# Patient Record
Sex: MALE | URBAN - METROPOLITAN AREA
[De-identification: names, ages, dates, MRNs, and addresses within clinical notes are randomized per-mention and may not be internally consistent; named-entity substitution may affect disease eponyms.]

---

## 2024-08-09 ENCOUNTER — APPOINTMENT (OUTPATIENT)
Dept: PRIMARY CARE | Facility: CLINIC | Age: 24
End: 2024-08-09
Payer: COMMERCIAL

## 2024-08-16 ENCOUNTER — APPOINTMENT (OUTPATIENT)
Dept: PRIMARY CARE | Facility: CLINIC | Age: 24
End: 2024-08-16
Payer: COMMERCIAL

## 2024-08-16 ENCOUNTER — LAB (OUTPATIENT)
Dept: LAB | Facility: LAB | Age: 24
End: 2024-08-16
Payer: COMMERCIAL

## 2024-08-16 VITALS
HEIGHT: 65 IN | BODY MASS INDEX: 25.83 KG/M2 | SYSTOLIC BLOOD PRESSURE: 125 MMHG | OXYGEN SATURATION: 95 % | HEART RATE: 83 BPM | DIASTOLIC BLOOD PRESSURE: 72 MMHG | WEIGHT: 155 LBS

## 2024-08-16 DIAGNOSIS — Z00.00 ANNUAL PHYSICAL EXAM: Primary | ICD-10-CM

## 2024-08-16 DIAGNOSIS — R22.1 MASS OF LEFT SIDE OF NECK: ICD-10-CM

## 2024-08-16 DIAGNOSIS — Z00.00 ANNUAL PHYSICAL EXAM: ICD-10-CM

## 2024-08-16 LAB
ALBUMIN SERPL BCP-MCNC: 4.6 G/DL (ref 3.4–5)
ALP SERPL-CCNC: 65 U/L (ref 33–120)
ALT SERPL W P-5'-P-CCNC: 19 U/L (ref 10–52)
ANION GAP SERPL CALC-SCNC: 14 MMOL/L (ref 10–20)
AST SERPL W P-5'-P-CCNC: 17 U/L (ref 9–39)
BASOPHILS # BLD AUTO: 0.03 X10*3/UL (ref 0–0.1)
BASOPHILS NFR BLD AUTO: 0.7 %
BILIRUB SERPL-MCNC: 0.4 MG/DL (ref 0–1.2)
BUN SERPL-MCNC: 13 MG/DL (ref 6–23)
CALCIUM SERPL-MCNC: 9.4 MG/DL (ref 8.6–10.6)
CHLORIDE SERPL-SCNC: 100 MMOL/L (ref 98–107)
CO2 SERPL-SCNC: 27 MMOL/L (ref 21–32)
CREAT SERPL-MCNC: 0.89 MG/DL (ref 0.5–1.3)
EGFRCR SERPLBLD CKD-EPI 2021: >90 ML/MIN/1.73M*2
EOSINOPHIL # BLD AUTO: 0.12 X10*3/UL (ref 0–0.7)
EOSINOPHIL NFR BLD AUTO: 2.7 %
ERYTHROCYTE [DISTWIDTH] IN BLOOD BY AUTOMATED COUNT: 11.9 % (ref 11.5–14.5)
GLUCOSE SERPL-MCNC: 83 MG/DL (ref 74–99)
HCT VFR BLD AUTO: 42 % (ref 41–52)
HGB BLD-MCNC: 14.5 G/DL (ref 13.5–17.5)
IMM GRANULOCYTES # BLD AUTO: 0.02 X10*3/UL (ref 0–0.7)
IMM GRANULOCYTES NFR BLD AUTO: 0.4 % (ref 0–0.9)
LYMPHOCYTES # BLD AUTO: 1.39 X10*3/UL (ref 1.2–4.8)
LYMPHOCYTES NFR BLD AUTO: 30.9 %
MCH RBC QN AUTO: 30.7 PG (ref 26–34)
MCHC RBC AUTO-ENTMCNC: 34.5 G/DL (ref 32–36)
MCV RBC AUTO: 89 FL (ref 80–100)
MONOCYTES # BLD AUTO: 0.5 X10*3/UL (ref 0.1–1)
MONOCYTES NFR BLD AUTO: 11.1 %
NEUTROPHILS # BLD AUTO: 2.44 X10*3/UL (ref 1.2–7.7)
NEUTROPHILS NFR BLD AUTO: 54.2 %
NRBC BLD-RTO: 0 /100 WBCS (ref 0–0)
PLATELET # BLD AUTO: 251 X10*3/UL (ref 150–450)
POTASSIUM SERPL-SCNC: 3.9 MMOL/L (ref 3.5–5.3)
PROT SERPL-MCNC: 7.3 G/DL (ref 6.4–8.2)
RBC # BLD AUTO: 4.72 X10*6/UL (ref 4.5–5.9)
SODIUM SERPL-SCNC: 137 MMOL/L (ref 136–145)
TSH SERPL-ACNC: 2.27 MIU/L (ref 0.44–3.98)
WBC # BLD AUTO: 4.5 X10*3/UL (ref 4.4–11.3)

## 2024-08-16 PROCEDURE — 84443 ASSAY THYROID STIM HORMONE: CPT

## 2024-08-16 PROCEDURE — 85025 COMPLETE CBC W/AUTO DIFF WBC: CPT

## 2024-08-16 PROCEDURE — 36415 COLL VENOUS BLD VENIPUNCTURE: CPT

## 2024-08-16 PROCEDURE — 80053 COMPREHEN METABOLIC PANEL: CPT

## 2024-08-16 RX ORDER — AMOXICILLIN AND CLAVULANATE POTASSIUM 875; 125 MG/1; MG/1
875 TABLET, FILM COATED ORAL 2 TIMES DAILY
Qty: 14 TABLET | Refills: 0 | Status: SHIPPED | OUTPATIENT
Start: 2024-08-16 | End: 2024-08-23

## 2024-08-16 ASSESSMENT — PATIENT HEALTH QUESTIONNAIRE - PHQ9
1. LITTLE INTEREST OR PLEASURE IN DOING THINGS: NOT AT ALL
SUM OF ALL RESPONSES TO PHQ9 QUESTIONS 1 AND 2: 0
2. FEELING DOWN, DEPRESSED OR HOPELESS: NOT AT ALL

## 2024-08-16 NOTE — PROGRESS NOTES
"Subjective   Patient ID: Temo Uribe is a 23 y.o. male who presents for Mass (Left side of neck).      HPI  Patient presents with 2-1/2 weeks history of numbness in the left side of neck that started 2-1/2 weeks ago and has been getting progressively bigger denies pain denies fever no sore throat no dental problems no skin issues has been healthy prior to this unsure about weight loss denies night sweats no appetites been fair  Has not seen a physician in years   Left side of neck 2.5 weeks   No pain.   Nno fever    No current outpatient medications on file prior to visit.     No current facility-administered medications on file prior to visit.        Review of Systems   Constitutional:  Negative for chills and fever.   HENT:          4 cm mass under the chin left side of neck hard to palpation nontender.  It is a fixed mass irregular   Respiratory: Negative.     Cardiovascular: Negative.    Gastrointestinal: Negative.  Negative for nausea and vomiting.   Endocrine: Negative.    Genitourinary: Negative.    Musculoskeletal: Negative.    Skin: Negative.  Negative for rash.   Allergic/Immunologic: Negative.    Neurological: Negative.    Hematological: Negative.    Psychiatric/Behavioral: Negative.     All other systems reviewed and are negative.      Objective   /72   Pulse 83   Ht 1.651 m (5' 5\")   Wt 70.3 kg (155 lb)   SpO2 95%   BMI 25.79 kg/m²   BSA: 1.8 meters squared  Growth percentiles: Facility age limit for growth %prateek is 20 years. Facility age limit for growth %prateek is 20 years.   Lab on 08/16/2024   Component Date Value Ref Range Status    WBC 08/16/2024 4.5  4.4 - 11.3 x10*3/uL Final    nRBC 08/16/2024 0.0  0.0 - 0.0 /100 WBCs Final    RBC 08/16/2024 4.72  4.50 - 5.90 x10*6/uL Final    Hemoglobin 08/16/2024 14.5  13.5 - 17.5 g/dL Final    Hematocrit 08/16/2024 42.0  41.0 - 52.0 % Final    MCV 08/16/2024 89  80 - 100 fL Final    MCH 08/16/2024 30.7  26.0 - 34.0 pg Final    MCHC 08/16/2024 " 34.5  32.0 - 36.0 g/dL Final    RDW 08/16/2024 11.9  11.5 - 14.5 % Final    Platelets 08/16/2024 251  150 - 450 x10*3/uL Final    Neutrophils % 08/16/2024 54.2  40.0 - 80.0 % Final    Immature Granulocytes %, Automated 08/16/2024 0.4  0.0 - 0.9 % Final    Immature Granulocyte Count (IG) includes promyelocytes, myelocytes and metamyelocytes but does not include bands. Percent differential counts (%) should be interpreted in the context of the absolute cell counts (cells/UL).    Lymphocytes % 08/16/2024 30.9  13.0 - 44.0 % Final    Monocytes % 08/16/2024 11.1  2.0 - 10.0 % Final    Eosinophils % 08/16/2024 2.7  0.0 - 6.0 % Final    Basophils % 08/16/2024 0.7  0.0 - 2.0 % Final    Neutrophils Absolute 08/16/2024 2.44  1.20 - 7.70 x10*3/uL Final    Percent differential counts (%) should be interpreted in the context of the absolute cell counts (cells/uL).    Immature Granulocytes Absolute, Au* 08/16/2024 0.02  0.00 - 0.70 x10*3/uL Final    Lymphocytes Absolute 08/16/2024 1.39  1.20 - 4.80 x10*3/uL Final    Monocytes Absolute 08/16/2024 0.50  0.10 - 1.00 x10*3/uL Final    Eosinophils Absolute 08/16/2024 0.12  0.00 - 0.70 x10*3/uL Final    Basophils Absolute 08/16/2024 0.03  0.00 - 0.10 x10*3/uL Final    Glucose 08/16/2024 83  74 - 99 mg/dL Final    Sodium 08/16/2024 137  136 - 145 mmol/L Final    Potassium 08/16/2024 3.9  3.5 - 5.3 mmol/L Final    Chloride 08/16/2024 100  98 - 107 mmol/L Final    Bicarbonate 08/16/2024 27  21 - 32 mmol/L Final    Anion Gap 08/16/2024 14  10 - 20 mmol/L Final    Urea Nitrogen 08/16/2024 13  6 - 23 mg/dL Final    Creatinine 08/16/2024 0.89  0.50 - 1.30 mg/dL Final    eGFR 08/16/2024 >90  >60 mL/min/1.73m*2 Final    Calculations of estimated GFR are performed using the 2021 CKD-EPI Study Refit equation without the race variable for the IDMS-Traceable creatinine methods.  https://jasn.asnjournals.org/content/early/2021/09/22/ASN.5994015572    Calcium 08/16/2024 9.4  8.6 - 10.6 mg/dL Final     Albumin 08/16/2024 4.6  3.4 - 5.0 g/dL Final    Alkaline Phosphatase 08/16/2024 65  33 - 120 U/L Final    Total Protein 08/16/2024 7.3  6.4 - 8.2 g/dL Final    AST 08/16/2024 17  9 - 39 U/L Final    Bilirubin, Total 08/16/2024 0.4  0.0 - 1.2 mg/dL Final    ALT 08/16/2024 19  10 - 52 U/L Final    Patients treated with Sulfasalazine may generate falsely decreased results for ALT.    Thyroid Stimulating Hormone 08/16/2024 2.27  0.44 - 3.98 mIU/L Final      Physical Exam  Vitals and nursing note reviewed.   Constitutional:       Appearance: Normal appearance.   HENT:      Right Ear: Tympanic membrane normal.      Left Ear: Tympanic membrane normal.      Nose: Nose normal.      Mouth/Throat:      Mouth: Mucous membranes are moist.   Eyes:      Pupils: Pupils are equal, round, and reactive to light.   Neck:      Comments: 4 cm mass under the chin left side of neck hard to palpation nontender.  It is a fixed mass irregular  Cardiovascular:      Rate and Rhythm: Normal rate and regular rhythm.      Pulses: Normal pulses.      Heart sounds: Normal heart sounds.   Pulmonary:      Effort: Pulmonary effort is normal.      Breath sounds: Normal breath sounds.   Abdominal:      General: Abdomen is flat. Bowel sounds are normal.      Palpations: Abdomen is soft.   Musculoskeletal:         General: Normal range of motion.   Skin:     General: Skin is warm and dry.      Capillary Refill: Capillary refill takes less than 2 seconds.   Neurological:      General: No focal deficit present.      Mental Status: He is alert and oriented to person, place, and time.   Psychiatric:         Mood and Affect: Mood normal.         Assessment/Plan   Problem List Items Addressed This Visit             ICD-10-CM    Annual physical exam - Primary Z00.00    Relevant Orders    CBC and Auto Differential (Completed)    Comprehensive metabolic panel (Completed)    TSH with reflex to Free T4 if abnormal (Completed)    Mass of left side of neck R22.1      Patient was scheduled for stat CT scan of his neck labs will be checked including white cell count         Relevant Medications    amoxicillin-pot clavulanate (Augmentin) 875-125 mg tablet    Other Relevant Orders    CT soft tissue neck w IV contrast

## 2024-08-17 PROBLEM — Z00.00 ANNUAL PHYSICAL EXAM: Status: ACTIVE | Noted: 2024-08-17

## 2024-08-17 PROBLEM — R22.1 MASS OF LEFT SIDE OF NECK: Status: ACTIVE | Noted: 2024-08-17

## 2024-08-17 ASSESSMENT — ENCOUNTER SYMPTOMS
GASTROINTESTINAL NEGATIVE: 1
RESPIRATORY NEGATIVE: 1
MUSCULOSKELETAL NEGATIVE: 1
CARDIOVASCULAR NEGATIVE: 1
ENDOCRINE NEGATIVE: 1
CHILLS: 0
HEMATOLOGIC/LYMPHATIC NEGATIVE: 1
VOMITING: 0
NEUROLOGICAL NEGATIVE: 1
FEVER: 0
ALLERGIC/IMMUNOLOGIC NEGATIVE: 1
NAUSEA: 0
PSYCHIATRIC NEGATIVE: 1

## 2024-08-17 NOTE — ASSESSMENT & PLAN NOTE
Patient was scheduled for stat CT scan of his neck labs will be checked including white cell count

## 2024-08-23 ENCOUNTER — HOSPITAL ENCOUNTER (OUTPATIENT)
Dept: RADIOLOGY | Facility: CLINIC | Age: 24
Discharge: HOME | End: 2024-08-23
Payer: COMMERCIAL

## 2024-08-23 DIAGNOSIS — Q18.0 SECOND BRANCHIAL CLEFT CYST: Primary | ICD-10-CM

## 2024-08-23 DIAGNOSIS — R22.1 MASS OF LEFT SIDE OF NECK: ICD-10-CM

## 2024-08-23 PROCEDURE — 2550000001 HC RX 255 CONTRASTS: Performed by: FAMILY MEDICINE

## 2024-08-23 PROCEDURE — 70491 CT SOFT TISSUE NECK W/DYE: CPT

## 2024-10-04 ENCOUNTER — APPOINTMENT (OUTPATIENT)
Dept: OTOLARYNGOLOGY | Facility: CLINIC | Age: 24
End: 2024-10-04
Payer: COMMERCIAL

## 2024-10-04 VITALS — HEIGHT: 65 IN | WEIGHT: 159.7 LBS | TEMPERATURE: 98.4 F | BODY MASS INDEX: 26.61 KG/M2

## 2024-10-04 DIAGNOSIS — Q18.0 SECOND BRANCHIAL CLEFT CYST: ICD-10-CM

## 2024-10-04 PROCEDURE — 99204 OFFICE O/P NEW MOD 45 MIN: CPT | Performed by: OTOLARYNGOLOGY

## 2024-10-04 PROCEDURE — 3008F BODY MASS INDEX DOCD: CPT | Performed by: OTOLARYNGOLOGY

## 2024-10-04 ASSESSMENT — PATIENT HEALTH QUESTIONNAIRE - PHQ9
SUM OF ALL RESPONSES TO PHQ9 QUESTIONS 1 AND 2: 0
2. FEELING DOWN, DEPRESSED OR HOPELESS: NOT AT ALL
1. LITTLE INTEREST OR PLEASURE IN DOING THINGS: NOT AT ALL

## 2024-10-04 NOTE — PROGRESS NOTES
"  Otolaryngology - Head and Neck Cancer Outpatient New Patient Note      Chief Concern:  Left neck mass    History Of Present Illness  Temo Uribe is a 23 y.o. male with no significant past medical history, presenting today for evaluation of left neck mass.    He saw his PCP who recommended CT scan and evaluation of the mass. He has noticed this mass since mid-July. It has remained consistent in size. No neck drainage now or in the past.     No nicotine use, no tobacco, alcohol use socially. No other drug use.    Past Medical History  He has no past medical history on file.  Patient Active Problem List   Diagnosis    Annual physical exam    Mass of left side of neck       Surgical History  He has no past surgical history on file.     Social History  He reports that he has never smoked. He has never been exposed to tobacco smoke. He has never used smokeless tobacco. He reports current alcohol use. He reports that he does not use drugs.    Family History  Family History   Problem Relation Name Age of Onset    No Known Problems Mother      No Known Problems Father          Allergies  Patient has no known allergies.    Review of Systems  A 12-point review of systems was performed and noted be negative except for that which was mentioned in the history of present illness     Last Recorded Vitals  Temperature 36.9 °C (98.4 °F), height 1.651 m (5' 5\"), weight 72.4 kg (159 lb 11.2 oz).     Physical Exam:  Constitutional:  No acute distress  Voice:  No hoarseness or other abnormality  Respiration:  Breathing comfortably, no stridor  Cardiovascular:  No clubbing/cyanosis/edema in hands  Eyes:  EOM intact, sclera normal  Neuro:  Alert and oriented times 3, Cranial nerves II-XII grossly intact and symmetric bilaterally  Head and Face:  Symmetric facial features, no masses or lesions, sinuses non-tender to palpation  Salivary Glands:  Parotid and submandibular glands normal bilaterally  Right Ear:  Normal external ear, " external auditory canal, and TM to otoscopy, normal hearing to whispered voice.  Left Ear: Normal external ear, external auditory canal, and TM to otoscopy, normal hearing to whispered voice.  Nose:  External nose midline, anterior rhinoscopy is normal with limited visualization to the anterior aspect of the interior turbinates, no bleeding or drainage, no lesions  Oral Cavity/Oropharynx/Lips:  Normal mucous membranes, normal floor of mouth/tongue/OP, no masses or lesions  Pharynx: no masses or lesions  Neck/Lymph:  No LAD, no thyroid masses, trachea midline  Skin:  Neck skin is without scar or injury, roughly 3cm left sided neck mass just lateral to larynx and inferior to SMG. Nontender, slightly mobile. No overlying punctum.  Psych:  Alert and oriented with appropriate mood and affect      Medications:  Medication Documentation Review Audit       Reviewed by Enid Macias MA (Medical Assistant) on 10/04/24 at 1309      Medication Order Taking? 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       Assessment/Plan   Temo Uribe is a 23 y.o. male with no significant past medical history, presenting today for evaluation of left neck mass. 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 I recommend surgical excision.    - Risks, benefits, alternatives of surgery discussed with the patient  - Patient is amenable to surgery  - Recommend surgical excision, okay for outpatient surgery in December    Roni Hoff MD  Resident, PGY-2  Otolaryngology - Head & Neck Surgery      Some or all of this note was completed using dictation software, please contact the author of this note if there is any confusion.        The above resident note has been reviewed and confirmed.  Patient was seen and  examined with the resident today.  Documentation has been reviewed.'

## 2024-11-14 ENCOUNTER — APPOINTMENT (OUTPATIENT)
Dept: PRIMARY CARE | Facility: CLINIC | Age: 24
End: 2024-11-14
Payer: COMMERCIAL

## 2024-11-21 ENCOUNTER — LAB (OUTPATIENT)
Dept: LAB | Facility: LAB | Age: 24
End: 2024-11-21
Payer: COMMERCIAL

## 2024-11-21 DIAGNOSIS — Q18.0 SECOND BRANCHIAL CLEFT CYST: ICD-10-CM

## 2024-11-21 PROCEDURE — 85027 COMPLETE CBC AUTOMATED: CPT

## 2024-11-21 PROCEDURE — 80048 BASIC METABOLIC PNL TOTAL CA: CPT

## 2024-11-21 PROCEDURE — 36415 COLL VENOUS BLD VENIPUNCTURE: CPT

## 2024-11-22 LAB
ANION GAP SERPL CALC-SCNC: 12 MMOL/L (ref 10–20)
BUN SERPL-MCNC: 15 MG/DL (ref 6–23)
CALCIUM SERPL-MCNC: 9.7 MG/DL (ref 8.6–10.6)
CHLORIDE SERPL-SCNC: 101 MMOL/L (ref 98–107)
CO2 SERPL-SCNC: 29 MMOL/L (ref 21–32)
CREAT SERPL-MCNC: 0.92 MG/DL (ref 0.5–1.3)
EGFRCR SERPLBLD CKD-EPI 2021: >90 ML/MIN/1.73M*2
ERYTHROCYTE [DISTWIDTH] IN BLOOD BY AUTOMATED COUNT: 11.5 % (ref 11.5–14.5)
GLUCOSE SERPL-MCNC: 84 MG/DL (ref 74–99)
HCT VFR BLD AUTO: 46.3 % (ref 41–52)
HGB BLD-MCNC: 15.8 G/DL (ref 13.5–17.5)
MCH RBC QN AUTO: 31 PG (ref 26–34)
MCHC RBC AUTO-ENTMCNC: 34.1 G/DL (ref 32–36)
MCV RBC AUTO: 91 FL (ref 80–100)
NRBC BLD-RTO: 0 /100 WBCS (ref 0–0)
PLATELET # BLD AUTO: 288 X10*3/UL (ref 150–450)
POTASSIUM SERPL-SCNC: 4.1 MMOL/L (ref 3.5–5.3)
RBC # BLD AUTO: 5.1 X10*6/UL (ref 4.5–5.9)
SODIUM SERPL-SCNC: 138 MMOL/L (ref 136–145)
WBC # BLD AUTO: 6 X10*3/UL (ref 4.4–11.3)

## 2024-12-04 ENCOUNTER — TELEPHONE (OUTPATIENT)
Dept: OTOLARYNGOLOGY | Facility: HOSPITAL | Age: 24
End: 2024-12-04
Payer: COMMERCIAL

## 2024-12-05 ENCOUNTER — ANESTHESIA EVENT (OUTPATIENT)
Dept: OPERATING ROOM | Facility: HOSPITAL | Age: 24
End: 2024-12-05
Payer: COMMERCIAL

## 2024-12-05 ENCOUNTER — ANESTHESIA (OUTPATIENT)
Dept: OPERATING ROOM | Facility: HOSPITAL | Age: 24
End: 2024-12-05
Payer: COMMERCIAL

## 2024-12-05 ENCOUNTER — HOSPITAL ENCOUNTER (OUTPATIENT)
Facility: HOSPITAL | Age: 24
Setting detail: OUTPATIENT SURGERY
Discharge: HOME | End: 2024-12-05
Attending: OTOLARYNGOLOGY | Admitting: OTOLARYNGOLOGY
Payer: COMMERCIAL

## 2024-12-05 ENCOUNTER — PHARMACY VISIT (OUTPATIENT)
Dept: PHARMACY | Facility: CLINIC | Age: 24
End: 2024-12-05
Payer: COMMERCIAL

## 2024-12-05 VITALS
RESPIRATION RATE: 15 BRPM | HEART RATE: 77 BPM | WEIGHT: 171.96 LBS | SYSTOLIC BLOOD PRESSURE: 112 MMHG | BODY MASS INDEX: 28.65 KG/M2 | HEIGHT: 65 IN | DIASTOLIC BLOOD PRESSURE: 69 MMHG | TEMPERATURE: 97.7 F | OXYGEN SATURATION: 97 %

## 2024-12-05 DIAGNOSIS — Q18.0 SECOND BRANCHIAL CLEFT CYST: Primary | ICD-10-CM

## 2024-12-05 PROCEDURE — 2500000005 HC RX 250 GENERAL PHARMACY W/O HCPCS: Performed by: OTOLARYNGOLOGY

## 2024-12-05 PROCEDURE — 3700000002 HC GENERAL ANESTHESIA TIME - EACH INCREMENTAL 1 MINUTE: Performed by: OTOLARYNGOLOGY

## 2024-12-05 PROCEDURE — RXMED WILLOW AMBULATORY MEDICATION CHARGE

## 2024-12-05 PROCEDURE — 3700000001 HC GENERAL ANESTHESIA TIME - INITIAL BASE CHARGE: Performed by: OTOLARYNGOLOGY

## 2024-12-05 PROCEDURE — 2500000004 HC RX 250 GENERAL PHARMACY W/ HCPCS (ALT 636 FOR OP/ED): Performed by: NURSE ANESTHETIST, CERTIFIED REGISTERED

## 2024-12-05 PROCEDURE — 7100000010 HC PHASE TWO TIME - EACH INCREMENTAL 1 MINUTE: Performed by: OTOLARYNGOLOGY

## 2024-12-05 PROCEDURE — 2500000005 HC RX 250 GENERAL PHARMACY W/O HCPCS: Performed by: NURSE ANESTHETIST, CERTIFIED REGISTERED

## 2024-12-05 PROCEDURE — 42815 EXCISION OF NECK CYST: CPT | Performed by: OTOLARYNGOLOGY

## 2024-12-05 PROCEDURE — 88304 TISSUE EXAM BY PATHOLOGIST: CPT | Mod: TC,AHULAB | Performed by: OTOLARYNGOLOGY

## 2024-12-05 PROCEDURE — 2500000004 HC RX 250 GENERAL PHARMACY W/ HCPCS (ALT 636 FOR OP/ED): Performed by: OTOLARYNGOLOGY

## 2024-12-05 PROCEDURE — 2720000007 HC OR 272 NO HCPCS: Performed by: OTOLARYNGOLOGY

## 2024-12-05 PROCEDURE — 7100000001 HC RECOVERY ROOM TIME - INITIAL BASE CHARGE: Performed by: OTOLARYNGOLOGY

## 2024-12-05 PROCEDURE — 7100000002 HC RECOVERY ROOM TIME - EACH INCREMENTAL 1 MINUTE: Performed by: OTOLARYNGOLOGY

## 2024-12-05 PROCEDURE — 3600000003 HC OR TIME - INITIAL BASE CHARGE - PROCEDURE LEVEL THREE: Performed by: OTOLARYNGOLOGY

## 2024-12-05 PROCEDURE — 7100000009 HC PHASE TWO TIME - INITIAL BASE CHARGE: Performed by: OTOLARYNGOLOGY

## 2024-12-05 PROCEDURE — 3600000008 HC OR TIME - EACH INCREMENTAL 1 MINUTE - PROCEDURE LEVEL THREE: Performed by: OTOLARYNGOLOGY

## 2024-12-05 PROCEDURE — 88304 TISSUE EXAM BY PATHOLOGIST: CPT | Performed by: STUDENT IN AN ORGANIZED HEALTH CARE EDUCATION/TRAINING PROGRAM

## 2024-12-05 RX ORDER — FENTANYL CITRATE 50 UG/ML
INJECTION, SOLUTION INTRAMUSCULAR; INTRAVENOUS AS NEEDED
Status: DISCONTINUED | OUTPATIENT
Start: 2024-12-05 | End: 2024-12-05

## 2024-12-05 RX ORDER — WATER 1 ML/ML
IRRIGANT IRRIGATION AS NEEDED
Status: DISCONTINUED | OUTPATIENT
Start: 2024-12-05 | End: 2024-12-05 | Stop reason: HOSPADM

## 2024-12-05 RX ORDER — CEFAZOLIN 1 G/1
INJECTION, POWDER, FOR SOLUTION INTRAVENOUS AS NEEDED
Status: DISCONTINUED | OUTPATIENT
Start: 2024-12-05 | End: 2024-12-05

## 2024-12-05 RX ORDER — OXYCODONE HYDROCHLORIDE 5 MG/1
5 TABLET ORAL EVERY 4 HOURS PRN
Status: DISCONTINUED | OUTPATIENT
Start: 2024-12-05 | End: 2024-12-05 | Stop reason: HOSPADM

## 2024-12-05 RX ORDER — MIDAZOLAM HYDROCHLORIDE 1 MG/ML
INJECTION INTRAMUSCULAR; INTRAVENOUS AS NEEDED
Status: DISCONTINUED | OUTPATIENT
Start: 2024-12-05 | End: 2024-12-05

## 2024-12-05 RX ORDER — ACETAMINOPHEN 325 MG/1
650 TABLET ORAL EVERY 6 HOURS PRN
Qty: 30 TABLET | Refills: 0 | Status: SHIPPED | OUTPATIENT
Start: 2024-12-05

## 2024-12-05 RX ORDER — ACETAMINOPHEN 325 MG/1
650 TABLET ORAL EVERY 4 HOURS PRN
Status: DISCONTINUED | OUTPATIENT
Start: 2024-12-05 | End: 2024-12-05 | Stop reason: HOSPADM

## 2024-12-05 RX ORDER — ONDANSETRON HYDROCHLORIDE 2 MG/ML
INJECTION, SOLUTION INTRAVENOUS AS NEEDED
Status: DISCONTINUED | OUTPATIENT
Start: 2024-12-05 | End: 2024-12-05

## 2024-12-05 RX ORDER — LIDOCAINE HYDROCHLORIDE 10 MG/ML
0.1 INJECTION, SOLUTION EPIDURAL; INFILTRATION; INTRACAUDAL; PERINEURAL ONCE
Status: DISCONTINUED | OUTPATIENT
Start: 2024-12-05 | End: 2024-12-05 | Stop reason: HOSPADM

## 2024-12-05 RX ORDER — SUCCINYLCHOLINE CHLORIDE 20 MG/ML
INJECTION INTRAMUSCULAR; INTRAVENOUS AS NEEDED
Status: DISCONTINUED | OUTPATIENT
Start: 2024-12-05 | End: 2024-12-05

## 2024-12-05 RX ORDER — TRAMADOL HYDROCHLORIDE 50 MG/1
50 TABLET ORAL EVERY 6 HOURS PRN
Qty: 15 TABLET | Refills: 0 | Status: SHIPPED | OUTPATIENT
Start: 2024-12-05 | End: 2024-12-10

## 2024-12-05 RX ORDER — LIDOCAINE HYDROCHLORIDE 40 MG/ML
SOLUTION TOPICAL AS NEEDED
Status: DISCONTINUED | OUTPATIENT
Start: 2024-12-05 | End: 2024-12-05

## 2024-12-05 RX ORDER — PROPOFOL 10 MG/ML
INJECTION, EMULSION INTRAVENOUS AS NEEDED
Status: DISCONTINUED | OUTPATIENT
Start: 2024-12-05 | End: 2024-12-05

## 2024-12-05 RX ORDER — LIDOCAINE HYDROCHLORIDE 20 MG/ML
INJECTION, SOLUTION EPIDURAL; INFILTRATION; INTRACAUDAL; PERINEURAL AS NEEDED
Status: DISCONTINUED | OUTPATIENT
Start: 2024-12-05 | End: 2024-12-05

## 2024-12-05 RX ORDER — LIDOCAINE HYDROCHLORIDE AND EPINEPHRINE 10; 10 UG/ML; MG/ML
INJECTION, SOLUTION INFILTRATION; PERINEURAL AS NEEDED
Status: DISCONTINUED | OUTPATIENT
Start: 2024-12-05 | End: 2024-12-05 | Stop reason: HOSPADM

## 2024-12-05 RX ORDER — IBUPROFEN 600 MG/1
600 TABLET ORAL EVERY 6 HOURS PRN
Qty: 30 TABLET | Refills: 0 | Status: SHIPPED | OUTPATIENT
Start: 2024-12-05

## 2024-12-05 RX ORDER — KETOROLAC TROMETHAMINE 30 MG/ML
INJECTION, SOLUTION INTRAMUSCULAR; INTRAVENOUS AS NEEDED
Status: DISCONTINUED | OUTPATIENT
Start: 2024-12-05 | End: 2024-12-05

## 2024-12-05 RX ORDER — SODIUM CHLORIDE, SODIUM LACTATE, POTASSIUM CHLORIDE, CALCIUM CHLORIDE 600; 310; 30; 20 MG/100ML; MG/100ML; MG/100ML; MG/100ML
100 INJECTION, SOLUTION INTRAVENOUS CONTINUOUS
Status: DISCONTINUED | OUTPATIENT
Start: 2024-12-05 | End: 2024-12-05 | Stop reason: HOSPADM

## 2024-12-05 SDOH — HEALTH STABILITY: MENTAL HEALTH: CURRENT SMOKER: 0

## 2024-12-05 ASSESSMENT — PAIN - FUNCTIONAL ASSESSMENT
PAIN_FUNCTIONAL_ASSESSMENT: 0-10

## 2024-12-05 ASSESSMENT — PAIN SCALES - GENERAL
PAINLEVEL_OUTOF10: 0 - NO PAIN

## 2024-12-05 ASSESSMENT — COLUMBIA-SUICIDE SEVERITY RATING SCALE - C-SSRS
2. HAVE YOU ACTUALLY HAD ANY THOUGHTS OF KILLING YOURSELF?: NO
6. HAVE YOU EVER DONE ANYTHING, STARTED TO DO ANYTHING, OR PREPARED TO DO ANYTHING TO END YOUR LIFE?: NO
1. IN THE PAST MONTH, HAVE YOU WISHED YOU WERE DEAD OR WISHED YOU COULD GO TO SLEEP AND NOT WAKE UP?: NO

## 2024-12-05 NOTE — NURSING NOTE
1130- assumed care of patient in phase 2 of care   1138- Girlfriend Andie at bedside   1148- discharge teaching completed with patient and girlfriend Andie, no questions at this time. Patient starting to get dressed.   1150- Got in touch with [facility] beds pharmacy to have medications delivered   1157- Ivs removed, waiting for medications to arrive  1204- medications delivered by pharmacy

## 2024-12-05 NOTE — ANESTHESIA PREPROCEDURE EVALUATION
"Patient: Temo Uribe    Procedure Information       Date/Time: 12/05/24 0900    Procedure: Left Cleft Branchial Cyst Excision (Left: Mouth)    Location: [facility] OR 02 / [facility] OR    Surgeons: Chico Hebert MD            Relevant Problems   No relevant active problems       Clinical information reviewed:   Tobacco  Allergies  Meds   Med Hx  Surg Hx   Fam Hx  Soc Hx         History reviewed. No pertinent past medical history.   History reviewed. No pertinent surgical history.  Social History     Tobacco Use   • Smoking status: Never     Passive exposure: Never   • Smokeless tobacco: Never   Vaping Use   • Vaping status: Never Used   Substance Use Topics   • Alcohol use: Yes     Comment: socially   • Drug use: Never      No current outpatient medications   No Known Allergies     Chemistry    Lab Results   Component Value Date/Time     11/21/2024 1617    K 4.1 11/21/2024 1617     11/21/2024 1617    CO2 29 11/21/2024 1617    BUN 15 11/21/2024 1617    CREATININE 0.92 11/21/2024 1617    Lab Results   Component Value Date/Time    CALCIUM 9.7 11/21/2024 1617    ALKPHOS 65 08/16/2024 0856    AST 17 08/16/2024 0856    ALT 19 08/16/2024 0856    BILITOT 0.4 08/16/2024 0856          No results found for: \"HGBA1C\"  Lab Results   Component Value Date/Time    WBC 6.0 11/21/2024 1617    HGB 15.8 11/21/2024 1617    HCT 46.3 11/21/2024 1617     11/21/2024 1617     No results found for: \"PROTIME\", \"PTT\", \"INR\"  No results found for: \"ABORH\"  No results found for this or any previous visit (from the past 4464 hours).  No results found for this or any previous visit from the past 1095 days.       Visit Vitals  /70   Pulse 68   Temp 36 °C (96.8 °F) (Temporal)   Resp 16   Ht 1.651 m (5' 5\")   Wt 78 kg (171 lb 15.3 oz)   SpO2 100%   BMI 28.62 kg/m²   Smoking Status Never   BSA 1.89 m²     NPO/Void Status  Carbohydrate Drink Given Prior to Surgery? : N  Date of Last Liquid: 12/05/24  Time of Last " Liquid: 0525  Date of Last Solid: 12/04/24  Time of Last Solid: 2245  Last Intake Type: Clear fluids  Time of Last Void: 0700        Physical Exam    Airway  Mallampati: II  TM distance: >3 FB  Neck ROM: full     Cardiovascular - normal exam     Dental - normal exam     Pulmonary - normal exam     Abdominal - normal exam         Anesthesia Plan    History of general anesthesia?: no  History of complications of general anesthesia?: no    ASA 1     general     The patient is not a current smoker.    intravenous induction   Anesthetic plan and risks discussed with patient.    Plan discussed with CRNA and attending.

## 2024-12-05 NOTE — H&P
History Of Present Illness  Temo Uribe is a 24 y.o. male presenting with with no significant past medical history, presenting today for evaluation of left neck mass. He has had no interval changes since office visit on 10/4/24.     He saw his PCP who recommended CT scan and evaluation of the mass. He has noticed this mass since mid-July. It has remained consistent in size. No neck drainage now or in the past.      No nicotine use, no tobacco, alcohol use socially. No other drug use..     Past Medical History  He has no past medical history on file.    Surgical History  He has no past surgical history on file.     Social History  He reports that he has never smoked. He has never been exposed to tobacco smoke. He has never used smokeless tobacco. He reports current alcohol use. He reports that he does not use drugs.    Family History  Family History   Problem Relation Name Age of Onset    No Known Problems Mother      No Known Problems Father          Allergies  Patient has no known allergies.    Review of Systems   A 12-point review of systems was performed and noted be negative except for that which was mentioned in the history of present illness     Physical Exam  Constitutional:  No acute distress  Voice:  No hoarseness or other abnormality  Respiration:  Breathing comfortably, no stridor  Cardiovascular:  No clubbing/cyanosis/edema in hands  Eyes:  EOM intact, sclera normal  Neuro:  Alert and oriented times 3, Cranial nerves II-XII grossly intact and symmetric bilaterally  Head and Face:  Symmetric facial features, no masses or lesions, sinuses non-tender to palpation  Salivary Glands:  Parotid and submandibular glands normal bilaterally  Right Ear:  Normal external ear, external auditory canal, and TM to otoscopy, normal hearing to whispered voice.  Left Ear: Normal external ear, external auditory canal, and TM to otoscopy, normal hearing to whispered voice.  Nose:  External nose midline, anterior  "rhinoscopy is normal with limited visualization to the anterior aspect of the interior turbinates, no bleeding or drainage, no lesions  Oral Cavity/Oropharynx/Lips:  Normal mucous membranes, normal floor of mouth/tongue/OP, no masses or lesions  Pharynx: no masses or lesions  Neck/Lymph:  No LAD, no thyroid masses, trachea midline  Skin:  Neck skin is without scar or injury, roughly 3cm left sided neck mass just lateral to larynx and inferior to SMG. Nontender, slightly mobile. No overlying punctum.  Psych:  Alert and oriented with appropriate mood and affect     Last Recorded Vitals  Blood pressure 122/70, pulse 68, temperature 36 °C (96.8 °F), temperature source Temporal, resp. rate 16, height 1.651 m (5' 5\"), weight 78 kg (171 lb 15.3 oz), SpO2 100%.    Relevant Results        Scheduled medications    Continuous medications    PRN medications       Assessment/Plan   Assessment & Plan  Second branchial cleft cyst      Plan for removal in OR today with Dr. Hebert.           Roni Hoff MD    "

## 2024-12-05 NOTE — OP NOTE
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]     Procedures  Left Cleft Branchial Cyst Excision  43932 - CO EXC BRANCHIAL CLEFT CYST CONFINED SKN&SUBQ TIS      Surgeons      * Chico Hebert - Primary    Resident/Fellow/Other Assistant:  Roni Hoff MD    Staff:   Circulator: Zita Rivas Person: Yeni  Circulator: Malini Chairez Scrub: Judy    Anesthesia Staff: Anesthesiologist: Hector De Jesus MD  CRNA: RICHARD Crabtree-ELIZABETH    Procedure Summary  Anesthesia: General  ASA: I  Estimated Blood Loss: 5mL  Intra-op Medications: Administrations occurring from 0900 to 1100 on 24:  * No intraprocedure medications in log *        Specimen: Left neck mass         Drains and/or Catheters: * None in log *    Tourniquet Times:         Implants:     Findings: Left neck cyst, just deep to platysma and anterior to SCM.    Indications: Temo Uribe is an 24 y.o. 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     The patient was seen in the preoperative area. The risks, benefits, complications, treatment options, non-operative alternatives, expected recovery and outcomes were discussed with the patient. The possibilities of reaction to medication, pulmonary aspiration, injury to surrounding structures, bleeding, recurrent infection, the need for additional procedures, failure to diagnose a condition, and creating a complication requiring transfusion or operation were discussed with the patient. The patient concurred with the proposed plan, giving informed consent.  The site of surgery was properly noted/marked if necessary per policy. The patient has been actively warmed in preoperative area. Preoperative antibiotics  have been ordered and given within 1 hours of incision. Venous thrombosis prophylaxis have been ordered including bilateral sequential compression devices    Procedure Details:   The patient was brought to the operating room and a preoperative timeout was performed by Dr. Hebert with all staff present. The patient was then induced under general anesthesia. The bed was then turned 90 degrees towards ENT.  Neck landmarks were palpated including the sternal notch, cricoid cartilage, hyoid bone, and thyroid cartilage, angle of mandible, SCM.  An approximately 4 cm horizontal incision was planned in an existing neck crease 2 fingerbreadths inferior to the mandible and this was infiltrated with 1% lidocaine with 1:100 epinephrine.  The patient was then prepped and draped in a sterile fashion.      A 15 blade was used to make incision through the epidermis and down into the subcutaneous tissue.  Monopolar cautery was used to dissect down through the subcutaneous tissue and we exposed the platysma and the lateral aspect of our surgical bed.  Subplastysmal flaps were raised using monopolar cautery inferiorly and superiorly above the aspect of the cyst. The capsule was then identified and the cyst was dissected out circumferentially with blunt dissection and bipolar cautery.  The capsule of the cyst remained intact.  The cyst was removed in 1 piece and sent to pathology as a permanent specimen for pathologic evaluation.    A Valsalva maneuver was performed and no obvious bleeding or air leak was noted.  The wound was copiously irrigated with saline.  A small amount of hemostasis was achieved with bipolar cautery.  Fibrillar was placed in the wound bed.  The deep dermal tissues were approximated using a 3-0 Vicryl suture and the epidermis was approximated using 4-0 Monocryl suture followed by Mastisol and Steri-Strips.      The patient was then handed back over the care of the anesthesia team, extubated, and taken to recovery  in stable condition.  She tolerated the procedure well.    Complications:  None; patient tolerated the procedure well.    Disposition: PACU - hemodynamically stable.  Condition: stable       Additional Details: BELEM    Attending Attestation:     Chico Hebert  Phone Number: [phone]

## 2024-12-05 NOTE — DISCHARGE INSTRUCTIONS
[facility] DEPARTMENT OF OTOLARYNGOLOGY (ENT):  NECK SURGERY POST-OPERATIVE INSTRUCTIONS    Operations performed:   Left Branchial Cleft Cyst Excision    Treatment/wound care:   Keep area(s) clean and dry.   It is okay to shower 48 hours after time of surgery.    Do not scrub wound(s), pat dry.    Do not submerge wound(s) in standing water until seen in followup (no tub bathing, swimming, or hot tubs).    Please visually inspect your wound(s) at least once daily.  If the wound(s) are in a difficult to see location, please use a mirror or have someone else assist with visual inspection.    If you have sutures that you can see outside of the skin or staples:  Please have staples/sutures removed in our clinic 10-14 days after the date of surgery.  Do not remove the staples/sutures on your own.  Return sooner or call if wound(s) or surrounding area have increased swelling, pain, warmth, redness, or drainage that is thick, yellow and/or green.    If you see white bandages on your neck:  You have steri strips (small paper tape) along your incision. You can shower, pat dry; do not soak in a tub.  The steri strips will fall off on their own; do not peel them off.    Expected Post-Surgical Symptoms:  You may experience neck pain and a hoarse/weak voice. These symptoms are often temporary and may be due to irritation from the breathing tube that's inserted during surgery. Swallowing difficulties due to pain for several days.     Pain Control:    Adequate management:   Take Ibuprofen and Tylenol every 6 hours as needed for pain. It is okay to stagger these medications if the pain is severe, so that you are taking one medication every three hours, alternating between the two.  Tramadol can be taken as prescribed as needed for breakthrough pain.      Activity after Discharge:   When you go home, you can usually return to your regular activities.  Avoid strenuous activities, such as bicycle riding, jogging, weight  lifting, or aerobic exercise, for at least 2 weeks.   No travelling for at least 7 days following surgery.  Do not drive or operate heavy machinery while taking narcotic pain medications as these medications can alter perception, impair judgement, and slow reaction times.    Diet: resume normal diet    Additional Instructions:   Medicines  DO NOT take blood thinners, such as warfarin (Coumadin), clopidogrel (Plavix), or aspirin until instructed to do so from your surgeon.   Do not take aspirin, medicines that contain aspirin, or non-steroidal anti-inflammatory medicines such as ibuprofen (Advil, Motrin) or naproxen (Aleve) for 2 weeks after surgery unless otherwise directed by your doctor.  Take pain medicines exactly as directed.   If you are prescribed antibiotics, take them as directed. Do not stop taking them just because you feel better. You need to take the full course of antibiotics.

## 2024-12-05 NOTE — ANESTHESIA PROCEDURE NOTES
Airway  Date/Time: 12/5/2024 9:17 AM  Urgency: elective    Airway not difficult    Staffing  Performed: CRNA   Authorized by: Hector De Jesus MD    Performed by: RICHARD Crabtree-ELIZABETH  Patient location during procedure: OR    Indications and Patient Condition  Indications for airway management: anesthesia and airway protection  Spontaneous Ventilation: absent  Sedation level: deep  Preoxygenated: yes  Patient position: sniffing  MILS maintained throughout  Mask difficulty assessment: 1 - vent by mask    Final Airway Details  Final airway type: endotracheal airway      Successful airway: ETT  Cuffed: yes   Successful intubation technique: direct laryngoscopy  Endotracheal tube insertion site: oral  Blade: Indira  Blade size: #4  ETT size (mm): 7.5  Cormack-Lehane Classification: grade IIb - view of arytenoids or posterior of glottis only  Placement verified by: chest auscultation and capnometry   Measured from: lips  ETT to lips (cm): 22  Number of attempts at approach: 1  Number of other approaches attempted: 0

## 2024-12-05 NOTE — ANESTHESIA POSTPROCEDURE EVALUATION
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])    Surgeons: Chico Hebert MD Responsible Provider: Hector De Jesus MD    Anesthesia Type: general ASA Status: 1            Anesthesia Type: general    Vitals Value Taken Time   /69 12/05/24 1129   Temp 37.5 12/05/24 1152   Pulse 85 12/05/24 1129   Resp 17 12/05/24 1129   SpO2 97 % 12/05/24 1129       Anesthesia Post Evaluation    Patient location during evaluation: PACU  Patient participation: complete - patient participated  Level of consciousness: awake  Pain management: adequate  Airway patency: patent  Cardiovascular status: acceptable  Respiratory status: acceptable  Hydration status: acceptable  Postoperative Nausea and Vomiting: none      No notable events documented.

## 2024-12-13 ENCOUNTER — APPOINTMENT (OUTPATIENT)
Dept: OTOLARYNGOLOGY | Facility: CLINIC | Age: 24
End: 2024-12-13
Payer: COMMERCIAL

## 2024-12-13 VITALS — HEIGHT: 65 IN | WEIGHT: 169 LBS | BODY MASS INDEX: 28.16 KG/M2

## 2024-12-13 DIAGNOSIS — Q18.0 SECOND BRANCHIAL CLEFT CYST: Primary | ICD-10-CM

## 2024-12-13 PROCEDURE — 3008F BODY MASS INDEX DOCD: CPT | Performed by: OTOLARYNGOLOGY

## 2024-12-13 PROCEDURE — 99024 POSTOP FOLLOW-UP VISIT: CPT | Performed by: OTOLARYNGOLOGY

## 2024-12-13 ASSESSMENT — PATIENT HEALTH QUESTIONNAIRE - PHQ9
SUM OF ALL RESPONSES TO PHQ9 QUESTIONS 1 AND 2: 0
1. LITTLE INTEREST OR PLEASURE IN DOING THINGS: NOT AT ALL
2. FEELING DOWN, DEPRESSED OR HOPELESS: NOT AT ALL

## 2024-12-16 ENCOUNTER — TELEPHONE (OUTPATIENT)
Dept: OTOLARYNGOLOGY | Facility: HOSPITAL | Age: 24
End: 2024-12-16
Payer: COMMERCIAL

## 2024-12-16 LAB
LABORATORY COMMENT REPORT: NORMAL
PATH REPORT.FINAL DX SPEC: NORMAL
PATH REPORT.GROSS SPEC: NORMAL
PATH REPORT.RELEVANT HX SPEC: NORMAL
PATH REPORT.TOTAL CANCER: NORMAL

## 2024-12-16 NOTE — PROGRESS NOTES
Left branchial cleft cyst excision 12/5/24    Doing well    No issues      PE CN's intact    Incision ok          Path is pending      Stable post op course    Local care reviewed      Follow up prn

## 2024-12-16 NOTE — TELEPHONE ENCOUNTER
----- Message from Chico Hebert sent at 12/16/2024  1:52 PM EST -----  Regarding: FW:  Can u pls let him know it was a cyst  Thanks  ----- Message -----  From: Lab, Background User  Sent: 12/16/2024  12:32 PM EST  To: Chico Hebert MD

## (undated) DEVICE — Device

## (undated) DEVICE — TOWEL, SURGICAL, NEURO, O/R, 16 X 26, BLUE, STERILE

## (undated) DEVICE — SYRINGE, 12 CC, LUER LOCK, CONTROL, MONOJECT

## (undated) DEVICE — STAPLER, SKIN PROXIMATE, 35 WIDE

## (undated) DEVICE — SUTURE, VICRYL PLUS 3-0, SH, 27IN

## (undated) DEVICE — SUTURE, VICRYL, 3-0,18 IN, SH, UNDYED

## (undated) DEVICE — SUTURE, VICRYL PLUS, 4-0, 27 IN, PS-2

## (undated) DEVICE — DRESSING, ISLAND, TELFA, 4 X 5 IN

## (undated) DEVICE — DRAPE, INSTRUMENT, W/POUCH, STERI DRAPE, 7 X 11 IN, DISPOSABLE, STERILE

## (undated) DEVICE — TIP, SUCTION, YANKAUER, BULB, ADULT

## (undated) DEVICE — DRESSING, NON-ADHERENT, TELFA, OUCHLESS, 3 X 8 IN, STERILE

## (undated) DEVICE — TUBING, SUCTION, NON-CONDUCTIVE, W/CONNECT,.25 IN X 12 FT, STERILE, LF

## (undated) DEVICE — GLOVE, SURGICAL, PROTEXIS PI ORTHO, 7.5, PF, LF

## (undated) DEVICE — SPONGE, DISSECTOR, PEANUT, 3/8, STERILE 5 FOAM HOLDER"

## (undated) DEVICE — MARKER, SKIN, DUAL TIP INK W/9 LABEL AND REMOVABLE TIME OUT SLEEVE

## (undated) DEVICE — SYRINGE, 10 CC, LUER LOCK

## (undated) DEVICE — STAY SET, SURGICAL, 5MM BLUNT HOOK, 8/PK

## (undated) DEVICE — TIP,  ELECTRODE COATED INSULATED, EXTENDED, LF

## (undated) DEVICE — SPONGE, HEMOSTATIC, CELLULOSE, SURGICEL, FIBRILLAR, 2 X 4 IN

## (undated) DEVICE — ADHESIVE, SKIN, MASTISOL, 2 OZ

## (undated) DEVICE — STRIP, SKIN CLOSURE, STERI STRIP, REINFORCED, 0.5 X 4 IN

## (undated) DEVICE — SUTURE, MONOCRYL, 4-0, 18 IN, PS2, UNDYED

## (undated) DEVICE — GLOVE, SURGICAL, PROTEXIS PI W/NEU-THERA, 7.5, PF, LF

## (undated) DEVICE — NEEDLE, HYPODERMIC, MONOJECT, 27 G X 1.5 IN

## (undated) DEVICE — PREP TRAY, VAGINAL